# Patient Record
Sex: FEMALE | Race: WHITE | Employment: FULL TIME | URBAN - METROPOLITAN AREA
[De-identification: names, ages, dates, MRNs, and addresses within clinical notes are randomized per-mention and may not be internally consistent; named-entity substitution may affect disease eponyms.]

---

## 2018-08-13 ENCOUNTER — HOSPITAL ENCOUNTER (EMERGENCY)
Age: 28
Discharge: HOME | End: 2018-08-13
Attending: EMERGENCY MEDICINE

## 2018-08-13 VITALS
RESPIRATION RATE: 18 BRPM | DIASTOLIC BLOOD PRESSURE: 77 MMHG | HEART RATE: 88 BPM | HEIGHT: 66 IN | WEIGHT: 242 LBS | SYSTOLIC BLOOD PRESSURE: 146 MMHG | TEMPERATURE: 99.4 F | OXYGEN SATURATION: 99 % | BODY MASS INDEX: 38.89 KG/M2

## 2018-08-13 DIAGNOSIS — H69.81 DYSFUNCTION OF RIGHT EUSTACHIAN TUBE: Primary | ICD-10-CM

## 2018-08-13 PROCEDURE — 6370000000 HC RX 637 (ALT 250 FOR IP): Performed by: EMERGENCY MEDICINE

## 2018-08-13 PROCEDURE — 99282 EMERGENCY DEPT VISIT SF MDM: CPT

## 2018-08-13 RX ORDER — PSEUDOEPHEDRINE HYDROCHLORIDE 30 MG/1
60 TABLET ORAL ONCE
Status: COMPLETED | OUTPATIENT
Start: 2018-08-13 | End: 2018-08-13

## 2018-08-13 RX ORDER — IBUPROFEN 400 MG/1
800 TABLET ORAL ONCE
Status: COMPLETED | OUTPATIENT
Start: 2018-08-13 | End: 2018-08-13

## 2018-08-13 RX ORDER — PSEUDOEPHEDRINE HCL 60 MG/1
60 TABLET ORAL EVERY 6 HOURS PRN
Qty: 15 TABLET | Refills: 0 | Status: SHIPPED | OUTPATIENT
Start: 2018-08-13

## 2018-08-13 RX ADMIN — IBUPROFEN 800 MG: 400 TABLET ORAL at 22:27

## 2018-08-13 RX ADMIN — PSEUDOEPHEDRINE HCL 60 MG: 30 TABLET, FILM COATED ORAL at 22:27

## 2018-08-13 ASSESSMENT — PAIN DESCRIPTION - LOCATION
LOCATION: EAR
LOCATION: EAR

## 2018-08-13 ASSESSMENT — PAIN SCALES - GENERAL
PAINLEVEL_OUTOF10: 8
PAINLEVEL_OUTOF10: 9
PAINLEVEL_OUTOF10: 8

## 2018-08-13 ASSESSMENT — PAIN DESCRIPTION - FREQUENCY
FREQUENCY: CONTINUOUS
FREQUENCY: CONTINUOUS

## 2018-08-13 ASSESSMENT — PAIN DESCRIPTION - PAIN TYPE
TYPE: ACUTE PAIN
TYPE: ACUTE PAIN

## 2018-08-13 ASSESSMENT — PAIN DESCRIPTION - ORIENTATION
ORIENTATION: RIGHT
ORIENTATION: RIGHT

## 2018-08-13 ASSESSMENT — PAIN DESCRIPTION - DESCRIPTORS
DESCRIPTORS: ACHING
DESCRIPTORS: ACHING

## 2018-08-13 ASSESSMENT — PAIN - FUNCTIONAL ASSESSMENT: PAIN_FUNCTIONAL_ASSESSMENT: 0-10

## 2018-08-14 NOTE — ED PROVIDER NOTES
[facility] Emergency Department  8/13/18     Patient Identification  Love Cote is a [id number] Me[age] Blossom y.o. female    Chief Complaint  Otalgia (allergies and right ear pain )      Mode of Arrival  private car    HPI  (History provided by patient)  This is a [id number] Meza Blossom y.o. female with a PMH significant for  has a past medical history of Allergic rhinitis; Bipolar affective disorder ([address] Carpal tunnel syndrome on right; Endometriosis; GERD (gastroesophageal reflux disease); Impaired glucose tolerance; Menorrhagia; Pyelonephritis; Recurrent tonsillitis; and Wheezing. presented today for right ear pain. Patient states she has allergies. She's got right ear pain which feels like a lot of pressure in her right ear pain anterior to the ear. She denies any drainage. She denies any fever or chills. She states it just feels clogged up. [place] Quiet ROS  10 systems reviewed and negative except as in HPI    I have reviewed the following nursing documentation:  Allergies: Allergies   Allergen Reactions    Bactrim     Biaxin [Clarithromycin]      rash    Prednisone     Augmentin [Amoxicillin-Pot Clavulanate] Nausea And Vomiting       Past medical history:  has a past medical history of Allergic rhinitis; Bipolar affective disorder ([address] Carpal tunnel syndrome on right; Endometriosis; GERD (gastroesophageal reflux disease); Impaired glucose tolerance; Menorrhagia; Pyelonephritis; Recurrent tonsillitis; and Wheezing. Past surgical history:  has a past surgical history that includes Tonsillectomy. Home medications:   Prior to Admission medications    Medication Sig Start Date End Date Taking?  Authorizing Provider   pseudoephedrine (SUDAFED) 60 MG tablet Take 1 tablet by mouth every 6 hours as needed for Congestion 8/13/18  Yes Jimi Dodson MD   cetirizine (ZYRTEC) 10 MG tablet Take 10 mg by mouth daily   Yes Historical Provider, MD   famotidine (PEPCID) 20 MG tablet Take 1 tablet by mouth 2 times daily for 5 days 4/1/18 8/13/18 Yes Dane Ritchie MD   albuterol (PROVENTIL HFA;VENTOLIN HFA) 108 (90 BASE) MCG/ACT inhaler Inhale 2 puffs into the lungs every 6 hours as needed. 1/11/12  Yes Emmett Chan MD       Social history:  reports that she quit smoking about 9 years ago. She has never used smokeless tobacco. She reports that she uses drugs, including Marijuana. She reports that she does not drink alcohol. Family history:    Family History   Problem Relation Age of Onset    Asthma Mother     High Blood Pressure Father     Diabetes Father     Diabetes Sister        Exam  ED Triage Vitals [08/13/18 2202]   BP Temp Temp src Pulse Resp SpO2 Height Weight   (!) 146/77 99.4 °F (37.4 °C) -- 88 18 99 % 5' 5.5\" (1.664 m) 242 lb (109.8 kg)        Nursing notes and vital signs reviewed    Constitutional - patient oriented to person, place, time. Well-developed well-nourished. Nontoxic. In no acute distress. HEENT  Head- normocephalic, atraumatic  Eyes-anicteric sclera, PERRL, no discharge  Right external canal erythematous. TMs are intact bilaterally ofotitismedia. Examshehasnodrainagebutconsistentwithotitisexternarightear. Throat-clear of exudate, no erythema  Mouth-membranes mucosa moist and pink    Lymphatic-  No significant lymphadenopathy noted in cervical, submandibular, auricular or inguinal regions    Neck-supple, trachea midline. No meningismus. Cardiovascular-regular rate and rhythm, no gallops rubs or murmurs, 2+ radial pulses    Pulmonary/Chest-  effort normal.  No respiratory distress. Clear to auscultation bilaterally, no stridor, no wheezes, no rales    Abdomen- soft nondistended. No tenderness. No rebound or guarding. Bowel sounds normal.  No masses. Musculoskeletal- no extremity edema. Compartments soft. No deformity. No tenderness in the extremities. Back-no tenderness over the bony spine. Neurologic- alert and oriented to person place and time. No facial droop. No slurred speech.   Motor intact in all 4 extremities. Normal muscle tone. Gait normal.    Skin- warm and dry, no rash. No petechiae. Psychiatric- normal mood and affect. MDM/ED Course  Orders Placed This Encounter   Medications    pseudoephedrine (SUDAFED) tablet 60 mg    ibuprofen (ADVIL;MOTRIN) tablet 800 mg    pseudoephedrine (SUDAFED) 60 MG tablet     Sig: Take 1 tablet by mouth every 6 hours as needed for Congestion     Dispense:  15 tablet     Refill:  0           Radiology  No results found. Labs    No results found for this visit on 08/13/18. . We thoroughly discussed the history, physical exam, laboratory and imaging studies, as well as, emergency department course. Based upon that discussion, we've decided to discharge Elizabeth Hopkins home. We've agreed upon prompt follow in pcp office for a re-assessment. Final Impression    1. Dysfunction of right eustachian tube        Blood pressure (!) 146/77, pulse 88, temperature 99.4 °F (37.4 °C), resp. rate 18, height 5' 5.5\" (1.664 m), weight 109.8 kg (242 lb), last menstrual period 08/03/2018, SpO2 99 %, not currently breastfeeding. Disposition:  Discharge to home in good condition. Patient was given scripts for the following medications. I counseled patient how to take these medications. Discharge Medication List as of 8/13/2018 10:25 PM      START taking these medications    Details   pseudoephedrine (SUDAFED) 60 MG tablet Take 1 tablet by mouth every 6 hours as needed for Congestion, Disp-15 tablet, R-0Print                 This chart was generated in part by using Dragon Dictation system and may contain errors related to that system including errors in grammar, punctuation, and spelling, as well as words and phrases that may be inappropriate. If there are any questions or concerns please feel free to contact the dictating provider for clarification.          Ebony Richards MD  08/14/18 5129

## 2018-10-03 ENCOUNTER — HOSPITAL ENCOUNTER (EMERGENCY)
Age: 28
Discharge: HOME | End: 2018-10-03
Attending: EMERGENCY MEDICINE
Payer: COMMERCIAL

## 2018-10-03 VITALS
DIASTOLIC BLOOD PRESSURE: 96 MMHG | HEART RATE: 99 BPM | HEIGHT: 65 IN | WEIGHT: 242.6 LBS | SYSTOLIC BLOOD PRESSURE: 151 MMHG | OXYGEN SATURATION: 98 % | RESPIRATION RATE: 16 BRPM | TEMPERATURE: 98.2 F | BODY MASS INDEX: 40.42 KG/M2

## 2018-10-03 DIAGNOSIS — M54.32 SCIATICA OF LEFT SIDE: Primary | ICD-10-CM

## 2018-10-03 PROCEDURE — 99283 EMERGENCY DEPT VISIT LOW MDM: CPT

## 2018-10-03 PROCEDURE — 6370000000 HC RX 637 (ALT 250 FOR IP): Performed by: EMERGENCY MEDICINE

## 2018-10-03 RX ORDER — PREDNISONE 10 MG/1
60 TABLET ORAL DAILY
Qty: 30 TABLET | Refills: 0 | Status: SHIPPED | OUTPATIENT
Start: 2018-10-03 | End: 2018-10-08

## 2018-10-03 RX ORDER — METHOCARBAMOL 750 MG/1
750 TABLET, FILM COATED ORAL 3 TIMES DAILY PRN
Qty: 30 TABLET | Refills: 0 | Status: SHIPPED | OUTPATIENT
Start: 2018-10-03 | End: 2018-10-13

## 2018-10-03 RX ORDER — TRAMADOL HYDROCHLORIDE 50 MG/1
50 TABLET ORAL EVERY 6 HOURS PRN
Qty: 12 TABLET | Refills: 0 | Status: SHIPPED | OUTPATIENT
Start: 2018-10-03 | End: 2018-10-06

## 2018-10-03 RX ORDER — PREDNISONE 20 MG/1
60 TABLET ORAL ONCE
Status: COMPLETED | OUTPATIENT
Start: 2018-10-03 | End: 2018-10-03

## 2018-10-03 RX ORDER — ACETAMINOPHEN 500 MG
1000 TABLET ORAL ONCE
Status: COMPLETED | OUTPATIENT
Start: 2018-10-03 | End: 2018-10-03

## 2018-10-03 RX ADMIN — ACETAMINOPHEN 1000 MG: 500 TABLET, FILM COATED ORAL at 14:24

## 2018-10-03 RX ADMIN — PREDNISONE 60 MG: 20 TABLET ORAL at 14:24

## 2018-10-03 ASSESSMENT — PAIN DESCRIPTION - LOCATION: LOCATION: BACK;LEG

## 2018-10-03 ASSESSMENT — PAIN SCALES - GENERAL
PAINLEVEL_OUTOF10: 8
PAINLEVEL_OUTOF10: 8

## 2018-10-03 ASSESSMENT — PAIN DESCRIPTION - PAIN TYPE: TYPE: ACUTE PAIN

## 2018-10-03 ASSESSMENT — PAIN DESCRIPTION - ORIENTATION: ORIENTATION: LEFT;LOWER
